# Patient Record
Sex: FEMALE | Race: WHITE | HISPANIC OR LATINO | URBAN - METROPOLITAN AREA
[De-identification: names, ages, dates, MRNs, and addresses within clinical notes are randomized per-mention and may not be internally consistent; named-entity substitution may affect disease eponyms.]

---

## 2017-06-13 ENCOUNTER — FOLLOW UP ESTABLISHED (OUTPATIENT)
Dept: URBAN - METROPOLITAN AREA CLINIC 10 | Facility: CLINIC | Age: 67
End: 2017-06-13
Payer: COMMERCIAL

## 2017-06-13 PROCEDURE — 92250 FUNDUS PHOTOGRAPHY W/I&R: CPT | Performed by: OPTOMETRIST

## 2017-06-13 PROCEDURE — 92012 INTRM OPH EXAM EST PATIENT: CPT | Performed by: OPTOMETRIST

## 2017-06-13 ASSESSMENT — INTRAOCULAR PRESSURE
OD: 17
OS: 17

## 2017-12-29 ENCOUNTER — FOLLOW UP ESTABLISHED (OUTPATIENT)
Dept: URBAN - METROPOLITAN AREA CLINIC 10 | Facility: CLINIC | Age: 67
End: 2017-12-29
Payer: COMMERCIAL

## 2017-12-29 PROCEDURE — 92012 INTRM OPH EXAM EST PATIENT: CPT | Performed by: OPTOMETRIST

## 2017-12-29 PROCEDURE — 92083 EXTENDED VISUAL FIELD XM: CPT | Performed by: OPTOMETRIST

## 2017-12-29 PROCEDURE — 92133 CPTRZD OPH DX IMG PST SGM ON: CPT | Performed by: OPTOMETRIST

## 2017-12-29 ASSESSMENT — INTRAOCULAR PRESSURE
OS: 16
OD: 16

## 2018-06-13 ENCOUNTER — POST OP (OUTPATIENT)
Dept: URBAN - METROPOLITAN AREA CLINIC 10 | Facility: CLINIC | Age: 68
End: 2018-06-13
Payer: COMMERCIAL

## 2018-06-13 DIAGNOSIS — H10.45 OTHER CHRONIC ALLERGIC CONJUNCTIVITIS: ICD-10-CM

## 2018-06-13 PROCEDURE — 92014 COMPRE OPH EXAM EST PT 1/>: CPT | Performed by: OPTOMETRIST

## 2018-06-13 PROCEDURE — 92250 FUNDUS PHOTOGRAPHY W/I&R: CPT | Performed by: OPTOMETRIST

## 2018-06-13 ASSESSMENT — KERATOMETRY
OS: 44.25
OD: 43.63

## 2018-06-13 ASSESSMENT — VISUAL ACUITY
OD: 20/20
OS: 20/20

## 2018-06-13 ASSESSMENT — INTRAOCULAR PRESSURE
OS: 17
OD: 17

## 2019-01-25 ENCOUNTER — FOLLOW UP ESTABLISHED (OUTPATIENT)
Dept: URBAN - METROPOLITAN AREA CLINIC 10 | Facility: CLINIC | Age: 69
End: 2019-01-25
Payer: COMMERCIAL

## 2019-01-25 PROCEDURE — 92012 INTRM OPH EXAM EST PATIENT: CPT | Performed by: OPTOMETRIST

## 2019-01-25 PROCEDURE — 92133 CPTRZD OPH DX IMG PST SGM ON: CPT | Performed by: OPTOMETRIST

## 2019-01-25 PROCEDURE — 92083 EXTENDED VISUAL FIELD XM: CPT | Performed by: OPTOMETRIST

## 2019-01-25 RX ORDER — LATANOPROST 50 UG/ML
0.005 % SOLUTION OPHTHALMIC
Qty: 2.5 | Refills: 3 | Status: INACTIVE
Start: 2019-01-25 | End: 2019-04-22

## 2019-01-25 RX ORDER — CYCLOSPORINE 0.5 MG/ML
0.05 % EMULSION OPHTHALMIC
Qty: 1 | Refills: 3 | Status: ACTIVE
Start: 2019-01-25

## 2019-01-25 ASSESSMENT — INTRAOCULAR PRESSURE
OD: 14
OS: 15

## 2019-08-15 ENCOUNTER — FOLLOW UP ESTABLISHED (OUTPATIENT)
Dept: URBAN - METROPOLITAN AREA CLINIC 10 | Facility: CLINIC | Age: 69
End: 2019-08-15
Payer: COMMERCIAL

## 2019-08-15 PROCEDURE — 92083 EXTENDED VISUAL FIELD XM: CPT | Performed by: OPTOMETRIST

## 2019-08-15 PROCEDURE — 92014 COMPRE OPH EXAM EST PT 1/>: CPT | Performed by: OPTOMETRIST

## 2019-08-15 PROCEDURE — 92250 FUNDUS PHOTOGRAPHY W/I&R: CPT | Performed by: OPTOMETRIST

## 2019-08-15 ASSESSMENT — INTRAOCULAR PRESSURE
OD: 22
OS: 22
OD: 18
OS: 19

## 2019-08-15 ASSESSMENT — VISUAL ACUITY
OD: 20/20
OS: 20/25

## 2019-08-15 ASSESSMENT — KERATOMETRY
OD: 44.00
OS: 44.63

## 2020-07-08 ENCOUNTER — FOLLOW UP ESTABLISHED (OUTPATIENT)
Dept: URBAN - METROPOLITAN AREA CLINIC 10 | Facility: CLINIC | Age: 70
End: 2020-07-08
Payer: COMMERCIAL

## 2020-07-08 DIAGNOSIS — H16.223 KERATOCONJUNCT SICCA, NOT SPECIFIED AS SJOGREN'S, BILATERAL: Primary | ICD-10-CM

## 2020-07-08 DIAGNOSIS — H26.493 OTHER SECONDARY CATARACT, BILATERAL: ICD-10-CM

## 2020-07-08 PROCEDURE — 92133 CPTRZD OPH DX IMG PST SGM ON: CPT | Performed by: OPTOMETRIST

## 2020-07-08 ASSESSMENT — VISUAL ACUITY
OD: 20/25
OS: 20/30

## 2020-07-08 ASSESSMENT — INTRAOCULAR PRESSURE
OS: 18
OD: 18

## 2020-08-18 ENCOUNTER — FOLLOW UP ESTABLISHED (OUTPATIENT)
Dept: URBAN - METROPOLITAN AREA CLINIC 10 | Facility: CLINIC | Age: 70
End: 2020-08-18
Payer: COMMERCIAL

## 2020-08-18 DIAGNOSIS — H43.812 VITREOUS DEGENERATION, LEFT EYE: Primary | ICD-10-CM

## 2020-08-18 DIAGNOSIS — H40.1131 VITREOUS DEGENERATION, LEFT EYE: ICD-10-CM

## 2020-08-18 PROCEDURE — 92014 COMPRE OPH EXAM EST PT 1/>: CPT | Performed by: OPTOMETRIST

## 2020-08-18 PROCEDURE — 92134 CPTRZ OPH DX IMG PST SGM RTA: CPT | Performed by: OPTOMETRIST

## 2020-08-18 RX ORDER — LATANOPROST 50 UG/ML
0.005 % SOLUTION OPHTHALMIC
Qty: 3 | Refills: 3 | Status: INACTIVE
Start: 2020-08-18 | End: 2021-11-04

## 2020-08-18 ASSESSMENT — INTRAOCULAR PRESSURE
OD: 15
OS: 16

## 2020-11-02 ENCOUNTER — FOLLOW UP ESTABLISHED (OUTPATIENT)
Dept: URBAN - METROPOLITAN AREA CLINIC 10 | Facility: CLINIC | Age: 70
End: 2020-11-02
Payer: COMMERCIAL

## 2020-11-02 PROCEDURE — 92012 INTRM OPH EXAM EST PATIENT: CPT | Performed by: OPTOMETRIST

## 2020-11-02 PROCEDURE — 92083 EXTENDED VISUAL FIELD XM: CPT | Performed by: OPTOMETRIST

## 2020-11-02 ASSESSMENT — INTRAOCULAR PRESSURE
OS: 17
OD: 19

## 2021-02-25 ENCOUNTER — FOLLOW UP ESTABLISHED (OUTPATIENT)
Dept: URBAN - METROPOLITAN AREA CLINIC 10 | Facility: CLINIC | Age: 71
End: 2021-02-25
Payer: COMMERCIAL

## 2021-02-25 PROCEDURE — 92133 CPTRZD OPH DX IMG PST SGM ON: CPT | Performed by: OPTOMETRIST

## 2021-02-25 PROCEDURE — 99213 OFFICE O/P EST LOW 20 MIN: CPT | Performed by: OPTOMETRIST

## 2021-02-25 ASSESSMENT — INTRAOCULAR PRESSURE
OD: 20
OS: 17

## 2021-11-01 ENCOUNTER — OFFICE VISIT (OUTPATIENT)
Dept: URBAN - METROPOLITAN AREA CLINIC 10 | Facility: CLINIC | Age: 71
End: 2021-11-01
Payer: COMMERCIAL

## 2021-11-01 DIAGNOSIS — H43.813 VITREOUS DEGENERATION, BILATERAL: ICD-10-CM

## 2021-11-01 PROCEDURE — 99214 OFFICE O/P EST MOD 30 MIN: CPT | Performed by: OPTOMETRIST

## 2021-11-01 PROCEDURE — 92250 FUNDUS PHOTOGRAPHY W/I&R: CPT | Performed by: OPTOMETRIST

## 2021-11-01 PROCEDURE — 92134 CPTRZ OPH DX IMG PST SGM RTA: CPT | Performed by: OPTOMETRIST

## 2021-11-01 ASSESSMENT — VISUAL ACUITY
OS: 20/25
OD: 20/20

## 2021-11-01 ASSESSMENT — INTRAOCULAR PRESSURE
OS: 18
OD: 20

## 2021-11-01 NOTE — IMPRESSION/PLAN
Impression: Other secondary cataract, bilateral Plan: Opacified capsule not affecting vision. No indication for treatment at this time but discussed possible YAG in the future.

## 2021-11-01 NOTE — IMPRESSION/PLAN
Impression: Keratoconjunctivitis sicca, bilateral Plan: Recommended to start using ATs QID OU. 
Pt. was using Restasis in the past.

## 2021-11-01 NOTE — IMPRESSION/PLAN
Impression: Vitreous degeneration, bilateral: H43.813. Plan: Discussed findings in detail. Discussed signs and symptoms of RD and RTC STAT if noticed. No retinal pathology. VR tuft sup-nasal OS.

## 2021-11-01 NOTE — IMPRESSION/PLAN
Impression: Primary open-angle glaucoma, mild stage, bilateral
--by hx. Previous trx started in 2009
--S/P LPI OU around 2004, mixed mechanism
--denies fhx, thick pachs, Tmax unknown
--s/p istent OU Plan: IOP is stable OU. Disk photos are stable OU. NFL OCT: normal OU. Past HVF: overall full OD, full OS. Continue Latanoprost QHS OU. Target likely ~20 or less. RTC 6 months for IOP check c NFL OCT.

## 2022-05-02 ENCOUNTER — OFFICE VISIT (OUTPATIENT)
Dept: URBAN - METROPOLITAN AREA CLINIC 10 | Facility: CLINIC | Age: 72
End: 2022-05-02
Payer: COMMERCIAL

## 2022-05-02 DIAGNOSIS — H40.1131 PRIMARY OPEN-ANGLE GLAUCOMA, BILATERAL, MILD STAGE: Primary | ICD-10-CM

## 2022-05-02 DIAGNOSIS — H26.493 OTHER SECONDARY CATARACT, BILATERAL: ICD-10-CM

## 2022-05-02 DIAGNOSIS — H16.223 KERATOCONJUNCTIVITIS SICCA, NOT SPECIFIED AS SJOGREN'S, BILATERAL: ICD-10-CM

## 2022-05-02 PROCEDURE — 99213 OFFICE O/P EST LOW 20 MIN: CPT | Performed by: OPTOMETRIST

## 2022-05-02 PROCEDURE — 92133 CPTRZD OPH DX IMG PST SGM ON: CPT | Performed by: OPTOMETRIST

## 2022-05-02 ASSESSMENT — INTRAOCULAR PRESSURE
OD: 22
OS: 21
OS: 25

## 2022-05-02 NOTE — IMPRESSION/PLAN
Edited Vendor info and details.   Impression: Primary open-angle glaucoma, mild stage, bilateral
--by hx. Previous trx started in 2009
--S/P LPI OU around 2004, mixed mechanism
--denies fhx, thick pachs 608/611, Tmax unknown
--s/p istent OU Plan: IOP is higher OU but NFL OCT remains normal OU. Pachs are thick so will CPM and monitor closely. Pt not consistent c gtt use. NFL OCT: normal OU. Past HVF: overall full OD, full OS. Continue Latanoprost QHS OU. Target likely ~20 or less. 
RTC 6 months for Complete exam c possible HVF 24-2

## 2022-11-08 ENCOUNTER — OFFICE VISIT (OUTPATIENT)
Dept: URBAN - METROPOLITAN AREA CLINIC 10 | Facility: CLINIC | Age: 72
End: 2022-11-08
Payer: COMMERCIAL

## 2022-11-08 DIAGNOSIS — H40.1131 PRIMARY OPEN-ANGLE GLAUCOMA, BILATERAL, MILD STAGE: ICD-10-CM

## 2022-11-08 DIAGNOSIS — H16.223 KERATOCONJUNCTIVITIS SICCA, NOT SPECIFIED AS SJOGREN'S, BILATERAL: ICD-10-CM

## 2022-11-08 DIAGNOSIS — H26.493 OTHER SECONDARY CATARACT, BILATERAL: Primary | ICD-10-CM

## 2022-11-08 DIAGNOSIS — H43.813 VITREOUS DEGENERATION, BILATERAL: ICD-10-CM

## 2022-11-08 PROCEDURE — 92134 CPTRZ OPH DX IMG PST SGM RTA: CPT | Performed by: OPTOMETRIST

## 2022-11-08 PROCEDURE — 99214 OFFICE O/P EST MOD 30 MIN: CPT | Performed by: OPTOMETRIST

## 2022-11-08 PROCEDURE — 92083 EXTENDED VISUAL FIELD XM: CPT | Performed by: OPTOMETRIST

## 2022-11-08 ASSESSMENT — VISUAL ACUITY
OD: 20/20
OS: 20/30

## 2022-11-08 ASSESSMENT — INTRAOCULAR PRESSURE
OS: 23
OD: 21

## 2022-11-08 NOTE — IMPRESSION/PLAN
Impression: Primary open-angle glaucoma, mild stage, bilateral
--by hx. Previous trx started in 2009
--S/P LPI OU around 2004, mixed mechanism
--denies fhx, thick pachs 608/611, Tmax unknown
--s/p istent OU Plan: IOP is higher OU. Pachs are thick so will CPM and monitor closely. HVF fluctuates c isolated defects. Non-specific. Will repeat next visit. Pt not consistent c gtt use. NFL OCT: normal OU. HVF: overall full OD, full OS. Continue Latanoprost QHS OU. Target likely ~20 or less. 
RTC 6 months for IOP check and order NFL OCT and HVF 24-2

## 2022-11-08 NOTE — IMPRESSION/PLAN
Impression: Other secondary cataract, bilateral Plan: Discussed diagnosis in detail with patient. Discussed treatment options with patient. Discussed risks and benefits and patient understands. Pt. elects YAG laser OS. Monitor OD as mild at this time.

## 2022-11-08 NOTE — IMPRESSION/PLAN
Impression: Keratoconjunctivitis sicca, bilateral Plan: Cont. ATs SOPHIE OU. 
Pt. was using Restasis in the past.

## 2022-11-21 ENCOUNTER — SURGERY (OUTPATIENT)
Dept: URBAN - METROPOLITAN AREA SURGERY 5 | Facility: SURGERY | Age: 72
End: 2022-11-21
Payer: COMMERCIAL

## 2022-11-21 PROCEDURE — 66821 AFTER CATARACT LASER SURGERY: CPT | Performed by: OPHTHALMOLOGY

## 2023-05-23 ENCOUNTER — OFFICE VISIT (OUTPATIENT)
Dept: URBAN - METROPOLITAN AREA CLINIC 10 | Facility: CLINIC | Age: 73
End: 2023-05-23
Payer: COMMERCIAL

## 2023-05-23 DIAGNOSIS — H40.1131 PRIMARY OPEN-ANGLE GLAUCOMA, BILATERAL, MILD STAGE: Primary | ICD-10-CM

## 2023-05-23 DIAGNOSIS — H16.223 KERATOCONJUNCTIVITIS SICCA, NOT SPECIFIED AS SJOGREN'S, BILATERAL: ICD-10-CM

## 2023-05-23 PROCEDURE — 92083 EXTENDED VISUAL FIELD XM: CPT | Performed by: OPTOMETRIST

## 2023-05-23 PROCEDURE — 99213 OFFICE O/P EST LOW 20 MIN: CPT | Performed by: OPTOMETRIST

## 2023-05-23 PROCEDURE — 92133 CPTRZD OPH DX IMG PST SGM ON: CPT | Performed by: OPTOMETRIST

## 2023-05-23 ASSESSMENT — INTRAOCULAR PRESSURE
OD: 20
OS: 23

## 2023-05-23 NOTE — IMPRESSION/PLAN
Impression: Primary open-angle glaucoma, mild stage, bilateral
--by hx. Previous trx started in 2009
--S/P LPI OU around 2004, mixed mechanism
--denies fhx, thick pachs 608/611, Tmax unknown
--s/p istent OU Plan: IOP is higher OU. Pachs are thick OU. HVF And NFL OCT remains stable and normal OU. Pt not consistent c gtt use. NFL OCT: normal OU. HVF: overall full OD, full OS. Continue Latanoprost QHS OU. Target likely ~20 or less. RTC 6 months for complete and order Mac OCT.

## 2023-11-27 ENCOUNTER — OFFICE VISIT (OUTPATIENT)
Dept: URBAN - METROPOLITAN AREA CLINIC 10 | Facility: CLINIC | Age: 73
End: 2023-11-27
Payer: COMMERCIAL

## 2023-11-27 DIAGNOSIS — H40.1131 PRIMARY OPEN-ANGLE GLAUCOMA, BILATERAL, MILD STAGE: Primary | ICD-10-CM

## 2023-11-27 DIAGNOSIS — H26.491 OTHER SECONDARY CATARACT, RIGHT EYE: ICD-10-CM

## 2023-11-27 DIAGNOSIS — H16.223 KERATOCONJUNCTIVITIS SICCA, NOT SPECIFIED AS SJOGREN'S, BILATERAL: ICD-10-CM

## 2023-11-27 DIAGNOSIS — H43.813 VITREOUS DEGENERATION, BILATERAL: ICD-10-CM

## 2023-11-27 PROCEDURE — 92134 CPTRZ OPH DX IMG PST SGM RTA: CPT | Performed by: OPTOMETRIST

## 2023-11-27 PROCEDURE — 99214 OFFICE O/P EST MOD 30 MIN: CPT | Performed by: OPTOMETRIST

## 2023-11-27 ASSESSMENT — INTRAOCULAR PRESSURE
OS: 17
OD: 15
OD: 16
OS: 13

## 2023-11-27 ASSESSMENT — VISUAL ACUITY
OD: 20/20
OS: 20/20

## 2024-06-04 ENCOUNTER — OFFICE VISIT (OUTPATIENT)
Dept: URBAN - METROPOLITAN AREA CLINIC 10 | Facility: CLINIC | Age: 74
End: 2024-06-04
Payer: MEDICARE

## 2024-06-04 DIAGNOSIS — H40.1131 PRIMARY OPEN-ANGLE GLAUCOMA, BILATERAL, MILD STAGE: ICD-10-CM

## 2024-06-04 DIAGNOSIS — H26.491 OTHER SECONDARY CATARACT, RIGHT EYE: ICD-10-CM

## 2024-06-04 DIAGNOSIS — H16.223 KERATOCONJUNCTIVITIS SICCA, NOT SPECIFIED AS SJOGREN'S, BILATERAL: Primary | ICD-10-CM

## 2024-06-04 PROCEDURE — 92083 EXTENDED VISUAL FIELD XM: CPT | Performed by: OPTOMETRIST

## 2024-06-04 PROCEDURE — 92133 CPTRZD OPH DX IMG PST SGM ON: CPT | Performed by: OPTOMETRIST

## 2024-06-04 PROCEDURE — 99213 OFFICE O/P EST LOW 20 MIN: CPT | Performed by: OPTOMETRIST

## 2024-06-04 ASSESSMENT — INTRAOCULAR PRESSURE
OS: 16
OD: 19

## 2025-03-05 ENCOUNTER — OFFICE VISIT (OUTPATIENT)
Dept: URBAN - METROPOLITAN AREA CLINIC 10 | Facility: CLINIC | Age: 75
End: 2025-03-05
Payer: MEDICARE

## 2025-03-05 DIAGNOSIS — H26.491 OTHER SECONDARY CATARACT, RIGHT EYE: ICD-10-CM

## 2025-03-05 DIAGNOSIS — H16.223 KERATOCONJUNCTIVITIS SICCA, NOT SPECIFIED AS SJOGREN'S, BILATERAL: ICD-10-CM

## 2025-03-05 DIAGNOSIS — H40.1131 PRIMARY OPEN-ANGLE GLAUCOMA, BILATERAL, MILD STAGE: Primary | ICD-10-CM

## 2025-03-05 DIAGNOSIS — H43.813 VITREOUS DEGENERATION, BILATERAL: ICD-10-CM

## 2025-03-05 ASSESSMENT — KERATOMETRY
OS: 44.25
OD: 44.00

## 2025-03-05 ASSESSMENT — INTRAOCULAR PRESSURE
OD: 16
OS: 17

## 2025-03-05 ASSESSMENT — VISUAL ACUITY
OS: 20/25
OD: 20/25